# Patient Record
Sex: FEMALE | Race: WHITE | NOT HISPANIC OR LATINO | URBAN - NONMETROPOLITAN AREA
[De-identification: names, ages, dates, MRNs, and addresses within clinical notes are randomized per-mention and may not be internally consistent; named-entity substitution may affect disease eponyms.]

---

## 2021-01-29 ENCOUNTER — OFFICE VISIT (OUTPATIENT)
Dept: URBAN - NONMETROPOLITAN AREA CLINIC 2 | Facility: CLINIC | Age: 21
End: 2021-01-29
Payer: COMMERCIAL

## 2021-01-29 ENCOUNTER — WEB ENCOUNTER (OUTPATIENT)
Dept: URBAN - NONMETROPOLITAN AREA CLINIC 2 | Facility: CLINIC | Age: 21
End: 2021-01-29

## 2021-01-29 DIAGNOSIS — K62.89 RECTAL PAIN: ICD-10-CM

## 2021-01-29 DIAGNOSIS — Z12.11 COLON CANCER SCREENING: ICD-10-CM

## 2021-01-29 DIAGNOSIS — K92.1 RECTAL BLEEDING: ICD-10-CM

## 2021-01-29 PROCEDURE — G9903 PT SCRN TBCO ID AS NON USER: HCPCS | Performed by: NURSE PRACTITIONER

## 2021-01-29 PROCEDURE — 99203 OFFICE O/P NEW LOW 30 MIN: CPT | Performed by: NURSE PRACTITIONER

## 2021-01-29 PROCEDURE — G8420 CALC BMI NORM PARAMETERS: HCPCS | Performed by: NURSE PRACTITIONER

## 2021-01-29 PROCEDURE — G8482 FLU IMMUNIZE ORDER/ADMIN: HCPCS | Performed by: NURSE PRACTITIONER

## 2021-01-29 PROCEDURE — G8427 DOCREV CUR MEDS BY ELIG CLIN: HCPCS | Performed by: NURSE PRACTITIONER

## 2021-01-29 NOTE — HPI-TODAY'S VISIT:
Milly is a 19 yo F who presents with rectal bleeding. Symptoms started in Dec. She is having scant bleeding with just about every BM currently. However, she is having a lot of rectal pain with defecation. She moves her bowels every other day. no change in bowel habit. She tried colace with no change. no additional complaints. SB

## 2021-01-29 NOTE — PHYSICAL EXAM GASTROINTESTINAL
Abdomen , soft, nontender, nondistended , no guarding or rigidity , no masses palpable , normal bowel sounds , Liver and Spleen , no hepatomegaly present , no hepatosplenomegaly , liver nontender , spleen not palpable , Rectal , normal sphincter tone , no internal hemorrhoids, rectal masses or bleeding present +ext hemorrhoids

## 2021-03-19 ENCOUNTER — OFFICE VISIT (OUTPATIENT)
Dept: URBAN - NONMETROPOLITAN AREA CLINIC 2 | Facility: CLINIC | Age: 21
End: 2021-03-19

## 2024-05-06 ENCOUNTER — DASHBOARD ENCOUNTERS (OUTPATIENT)
Age: 24
End: 2024-05-06

## 2024-05-06 ENCOUNTER — OFFICE VISIT (OUTPATIENT)
Dept: URBAN - NONMETROPOLITAN AREA CLINIC 2 | Facility: CLINIC | Age: 24
End: 2024-05-06
Payer: COMMERCIAL

## 2024-05-06 ENCOUNTER — LAB OUTSIDE AN ENCOUNTER (OUTPATIENT)
Dept: URBAN - NONMETROPOLITAN AREA CLINIC 2 | Facility: CLINIC | Age: 24
End: 2024-05-06

## 2024-05-06 VITALS
HEIGHT: 66 IN | DIASTOLIC BLOOD PRESSURE: 75 MMHG | SYSTOLIC BLOOD PRESSURE: 122 MMHG | BODY MASS INDEX: 20.89 KG/M2 | WEIGHT: 130 LBS | HEART RATE: 71 BPM

## 2024-05-06 DIAGNOSIS — K52.9 CHRONIC DIARRHEA: ICD-10-CM

## 2024-05-06 DIAGNOSIS — K92.1 RECTAL BLEEDING: ICD-10-CM

## 2024-05-06 DIAGNOSIS — K62.89 RECTAL PAIN: ICD-10-CM

## 2024-05-06 DIAGNOSIS — Z12.11 COLON CANCER SCREENING: ICD-10-CM

## 2024-05-06 PROBLEM — 236071009: Status: ACTIVE | Noted: 2024-05-06

## 2024-05-06 PROCEDURE — 99214 OFFICE O/P EST MOD 30 MIN: CPT | Performed by: NURSE PRACTITIONER

## 2024-05-06 RX ORDER — NORGESTIMATE AND ETHINYL ESTRADIOL 0.25-0.035
TAKE ONE TABLET BY MOUTH ONE TIME DAILY KIT ORAL
Qty: 84 UNSPECIFIED | Refills: 2 | Status: ACTIVE | COMMUNITY

## 2024-05-06 RX ORDER — DICYCLOMINE HYDROCHLORIDE 10 MG/1
1 CAPSULE 30 MINUTES BEFORE EATING CAPSULE ORAL
Qty: 90 | Refills: 3 | OUTPATIENT
Start: 2024-05-06 | End: 2024-09-03

## 2024-06-20 ENCOUNTER — CLAIMS CREATED FROM THE CLAIM WINDOW (OUTPATIENT)
Dept: URBAN - METROPOLITAN AREA CLINIC 4 | Facility: CLINIC | Age: 24
End: 2024-06-20
Payer: COMMERCIAL

## 2024-06-20 ENCOUNTER — CLAIMS CREATED FROM THE CLAIM WINDOW (OUTPATIENT)
Dept: URBAN - NONMETROPOLITAN AREA SURGERY CENTER 1 | Facility: SURGERY CENTER | Age: 24
End: 2024-06-20
Payer: COMMERCIAL

## 2024-06-20 DIAGNOSIS — K62.5 ANAL BLEEDING: ICD-10-CM

## 2024-06-20 DIAGNOSIS — R19.7 ACUTE DIARRHEA: ICD-10-CM

## 2024-06-20 DIAGNOSIS — K64.8 OTHER HEMORRHOIDS: ICD-10-CM

## 2024-06-20 DIAGNOSIS — R19.7 CHRONIC DIARRHEA: ICD-10-CM

## 2024-06-20 DIAGNOSIS — K62.5 RECTAL BLEEDING: ICD-10-CM

## 2024-06-20 DIAGNOSIS — K63.89 OTHER SPECIFIED DISEASES OF INTESTINE: ICD-10-CM

## 2024-06-20 PROCEDURE — 00811 ANES LWR INTST NDSC NOS: CPT | Performed by: NURSE ANESTHETIST, CERTIFIED REGISTERED

## 2024-06-20 PROCEDURE — 88313 SPECIAL STAINS GROUP 2: CPT | Performed by: PATHOLOGY

## 2024-06-20 PROCEDURE — 88342 IMHCHEM/IMCYTCHM 1ST ANTB: CPT | Performed by: PATHOLOGY

## 2024-06-20 PROCEDURE — 45380 COLONOSCOPY AND BIOPSY: CPT | Performed by: INTERNAL MEDICINE

## 2024-06-20 PROCEDURE — 88305 TISSUE EXAM BY PATHOLOGIST: CPT | Performed by: PATHOLOGY

## 2024-06-20 RX ORDER — DICYCLOMINE HYDROCHLORIDE 10 MG/1
1 CAPSULE 30 MINUTES BEFORE EATING CAPSULE ORAL
Qty: 90 | Refills: 3 | Status: ACTIVE | COMMUNITY
Start: 2024-05-06 | End: 2024-09-03

## 2024-06-20 RX ORDER — NORGESTIMATE AND ETHINYL ESTRADIOL 0.25-0.035
TAKE ONE TABLET BY MOUTH ONE TIME DAILY KIT ORAL
Qty: 84 UNSPECIFIED | Refills: 2 | Status: ACTIVE | COMMUNITY

## 2024-06-21 ENCOUNTER — TELEPHONE ENCOUNTER (OUTPATIENT)
Dept: URBAN - METROPOLITAN AREA CLINIC 80 | Facility: CLINIC | Age: 24
End: 2024-06-21

## 2024-06-21 ENCOUNTER — LAB OUTSIDE AN ENCOUNTER (OUTPATIENT)
Dept: URBAN - METROPOLITAN AREA CLINIC 80 | Facility: CLINIC | Age: 24
End: 2024-06-21

## 2024-06-25 ENCOUNTER — TELEPHONE ENCOUNTER (OUTPATIENT)
Dept: URBAN - NONMETROPOLITAN AREA CLINIC 2 | Facility: CLINIC | Age: 24
End: 2024-06-25

## 2024-06-25 RX ORDER — HYOSCYAMINE SULFATE 0.12 MG/1
1 TABLET UNDER THE TONGUE AND ALLOW TO DISSOLVE  AS NEEDED TABLET SUBLINGUAL THREE TIMES A DAY
Qty: 90 | Refills: 3 | OUTPATIENT
Start: 2024-06-25 | End: 2024-10-23

## 2024-07-17 ENCOUNTER — OFFICE VISIT (OUTPATIENT)
Dept: URBAN - NONMETROPOLITAN AREA CLINIC 2 | Facility: CLINIC | Age: 24
End: 2024-07-17
Payer: COMMERCIAL

## 2024-07-17 VITALS
DIASTOLIC BLOOD PRESSURE: 64 MMHG | BODY MASS INDEX: 20.89 KG/M2 | WEIGHT: 130 LBS | HEART RATE: 63 BPM | SYSTOLIC BLOOD PRESSURE: 113 MMHG | HEIGHT: 66 IN

## 2024-07-17 DIAGNOSIS — K59.1 CHRONIC FUNCTIONAL DIARRHEA: ICD-10-CM

## 2024-07-17 DIAGNOSIS — Z12.11 COLON CANCER SCREENING: ICD-10-CM

## 2024-07-17 DIAGNOSIS — K92.1 RECTAL BLEEDING: ICD-10-CM

## 2024-07-17 DIAGNOSIS — K62.89 RECTAL PAIN: ICD-10-CM

## 2024-07-17 PROCEDURE — 99214 OFFICE O/P EST MOD 30 MIN: CPT | Performed by: NURSE PRACTITIONER

## 2024-07-17 RX ORDER — HYOSCYAMINE SULFATE 0.12 MG/1
1 TABLET UNDER THE TONGUE AND ALLOW TO DISSOLVE  AS NEEDED TABLET SUBLINGUAL THREE TIMES A DAY
Qty: 90 | Refills: 3 | Status: ACTIVE | COMMUNITY
Start: 2024-06-25 | End: 2024-10-23

## 2024-07-17 RX ORDER — AMITRIPTYLINE HYDROCHLORIDE 10 MG/1
1 TABLET AT BEDTIME TABLET, FILM COATED ORAL ONCE A DAY
Qty: 30 | Refills: 3 | OUTPATIENT
Start: 2024-07-17

## 2024-07-17 RX ORDER — DICYCLOMINE HYDROCHLORIDE 10 MG/1
1 CAPSULE 30 MINUTES BEFORE EATING CAPSULE ORAL
Qty: 90 | Refills: 3 | Status: ACTIVE | COMMUNITY
Start: 2024-05-06 | End: 2024-09-03

## 2024-07-17 RX ORDER — NORGESTIMATE AND ETHINYL ESTRADIOL 0.25-0.035
TAKE ONE TABLET BY MOUTH ONE TIME DAILY KIT ORAL
Qty: 84 UNSPECIFIED | Refills: 2 | Status: ACTIVE | COMMUNITY

## 2024-07-17 NOTE — HPI-OTHER HISTORIES
1/29/2021: Milly is a 21 yo F who presents with rectal bleeding. Symptoms started in Dec. She is having scant bleeding with just about every BM currently. However, she is having a lot of rectal pain with defecation. She moves her bowels every other day. no change in bowel habit. She tried colace with no change. no additional complaints. SB  5/6/2024: Milly is a 24-year-old female who presents today for oingoing diarrhea for the last year. She reports loose stools occur on a daily basis. Baseline is constipation every 2 to 3 days. She had negative stool studies and a normal physical In 1 year ago with functional medicine and. She was started on supplements but had no relief. 2 weeks ago she noticed dark red blood in her stool, though she thinks this may have been her menstrual cycle as she started it later that day. No recurrence of bleeding. Rectal pain resolved following visit in 2021 with HC/nifedipine/lido ointment. Her weight has been stable and she denies any nocturnal bowel movements. She is concerned about possible colorectal cancer. No known family hx. LG.  COLON - 6/2024 - internal and external nonbleeding hemorrhoids, otherwise normal

## 2024-07-17 NOTE — HPI-TODAY'S VISIT:
Milly returns for f/u of loose stools. Since last visit she had normal colonoscopy besides internal and external hermorrhoids. She struggled with sharp upper abd discomfort after the procedure but xray was obtained without acute process and pain subsided with time. Currently reports doing ok. She continues to have loose stools. Did not try florastor because it has a dairy component and she is allergic. LG.

## 2024-09-18 ENCOUNTER — OFFICE VISIT (OUTPATIENT)
Dept: URBAN - NONMETROPOLITAN AREA CLINIC 2 | Facility: CLINIC | Age: 24
End: 2024-09-18

## 2024-09-18 RX ORDER — HYOSCYAMINE SULFATE 0.12 MG/1
1 TABLET UNDER THE TONGUE AND ALLOW TO DISSOLVE  AS NEEDED TABLET SUBLINGUAL THREE TIMES A DAY
Qty: 90 | Refills: 3 | Status: ACTIVE | COMMUNITY
Start: 2024-06-25 | End: 2024-10-23

## 2024-09-18 RX ORDER — AMITRIPTYLINE HYDROCHLORIDE 10 MG/1
1 TABLET AT BEDTIME TABLET, FILM COATED ORAL ONCE A DAY
Qty: 30 | Refills: 3 | Status: ACTIVE | COMMUNITY
Start: 2024-07-17

## 2024-09-18 RX ORDER — NORGESTIMATE AND ETHINYL ESTRADIOL 0.25-0.035
TAKE ONE TABLET BY MOUTH ONE TIME DAILY KIT ORAL
Qty: 84 UNSPECIFIED | Refills: 2 | Status: ACTIVE | COMMUNITY